# Patient Record
Sex: MALE | Race: WHITE | NOT HISPANIC OR LATINO
[De-identification: names, ages, dates, MRNs, and addresses within clinical notes are randomized per-mention and may not be internally consistent; named-entity substitution may affect disease eponyms.]

---

## 2017-03-02 ENCOUNTER — TRANSCRIPTION ENCOUNTER (OUTPATIENT)
Age: 22
End: 2017-03-02

## 2017-06-26 ENCOUNTER — TRANSCRIPTION ENCOUNTER (OUTPATIENT)
Age: 22
End: 2017-06-26

## 2019-04-05 ENCOUNTER — EMERGENCY (EMERGENCY)
Facility: HOSPITAL | Age: 24
LOS: 1 days | Discharge: HOME | End: 2019-04-05
Attending: EMERGENCY MEDICINE
Payer: COMMERCIAL

## 2019-04-05 VITALS
RESPIRATION RATE: 20 BRPM | HEART RATE: 78 BPM | HEIGHT: 74 IN | TEMPERATURE: 99 F | OXYGEN SATURATION: 97 % | WEIGHT: 274.92 LBS | DIASTOLIC BLOOD PRESSURE: 88 MMHG | SYSTOLIC BLOOD PRESSURE: 151 MMHG

## 2019-04-05 PROCEDURE — 99283 EMERGENCY DEPT VISIT LOW MDM: CPT | Mod: 25

## 2019-04-05 PROCEDURE — 99283 EMERGENCY DEPT VISIT LOW MDM: CPT

## 2019-04-05 PROCEDURE — 73030 X-RAY EXAM OF SHOULDER: CPT | Mod: 26,LT

## 2019-04-05 PROCEDURE — 73030 X-RAY EXAM OF SHOULDER: CPT

## 2019-04-05 PROCEDURE — 99053 MED SERV 10PM-8AM 24 HR FAC: CPT

## 2019-04-05 RX ORDER — IBUPROFEN 200 MG
600 TABLET ORAL ONCE
Refills: 0 | Status: COMPLETED | OUTPATIENT
Start: 2019-04-05 | End: 2019-04-05

## 2019-04-05 RX ADMIN — Medication 600 MILLIGRAM(S): at 06:17

## 2019-04-05 NOTE — ED PROVIDER NOTE - PHYSICAL EXAMINATION
Gen: NAD, AOx3  Head: NCAT  HEENT: PERRL, oral mucosa moist, normal conjunctiva  Lung: CTAB, no respiratory distress  CV: rrr, no murmurs, Normal perfusion  Abd: soft, NTND, no CVA tenderness  MSK: No edema, soft compartments, shoulders are grossly symmetric, L shoulder posteriorly tender without bruising/swelling, all other extremity joints nontender with full ROM; clavicles nontender; snuffbox nontender bilaterally; chest wall nontender; pelvis stable nontender; entire spine without midline tenderness and with full ROM, no stepoffs or masses   Neuro: No focal neurologic deficits, motor and sensation intact/symmetric in all extremities  Skin: No rash   Psych: normal affect

## 2019-04-05 NOTE — ED PROVIDER NOTE - NSFOLLOWUPINSTRUCTIONS_ED_ALL_ED_FT
1. Return to ED for worsening, progressive or any other concerning symptoms such as trouble breathing, severe pain, trouble walking, inability to eat/drink due to vomiting, new weakness on one side of the body, or confusion  2. Follow up with your primary care doctor in 2-3 days, you may also call the internal medicine clinic at [phone] for appointment or dial [phone] for help finding a provider   3. Please take motrin 600 mg every 6 hours and tylenol 1000 mg every 6 hours as needed for pain control. Apply ice packs to areas of muscle soreness for 15-20 minutes at a time, 2-3 times per day.  You will likely have worsened muscle soreness on day 2 or 3 after the accident.   4. Please call one of the orthopedists on the attached sheet for a follow up appointment in the next week      Shoulder Range of Motion Exercises  ImageShoulder range of motion (ROM) exercises are designed to keep the shoulder moving freely. They are often recommended for people who have shoulder pain.    Phase 1 exercises  When you are able, do this exercise 5–6 days per week, or as told by your health care provider. Work toward doing 2 sets of 10 swings.    Pendulum Exercise     How To Do This Exercise Lying Down     Lie face-down on a bed with your abdomen close to the side of the bed.    Let your arm hang over the side of the bed.    Relax your shoulder, arm, and hand.    Slowly and gently swing your arm forward and back. Do not use your neck muscles to swing your arm. They should be relaxed. If you are struggling to swing your arm, have someone gently swing it for you. When you do this exercise for the first time, swing your arm at a 15 degree angle for 15 seconds, or swing your arm 10 times. As pain lessens over time, increase the angle of the swing to 30–45 degrees.    Repeat steps 1–4 with the other arm.      How To Do This Exercise While Standing     Stand next to a sturdy chair or table and hold on to it with your hand.    Bend forward at the waist.    Bend your knees slightly.    Relax your other arm and let it hang limp.    Relax the shoulder blade of the arm that is hanging and let it drop.    While keeping your shoulder relaxed, use body motion to swing your arm in small circles. The first time you do this exercise, swing your arm for about 30 seconds or 10 times. When you do it next time, swing your arm for a little longer.    Stand up tall and relax.    Repeat steps 1–7, this time changing the direction of the circles.      Repeat steps 1–8 with the other arm.      Phase 2 exercises  Do these exercises 3–4 times per day on 5–6 days per week or as told by your health care provider. Work toward holding the stretch for 20 seconds.    Stretching Exercise 1     Lift your arm straight out in front of you.    Bend your arm 90 degrees at the elbow (right angle) so your forearm goes across your body and looks like the letter "L."    Use your other arm to gently pull the elbow forward and across your body.    Repeat steps 1–3 with the other arm.    Stretching Exercise 2     You will need a towel or rope for this exercise.    Bend one arm behind your back with the palm facing outward.    Hold a towel with your other hand.    Reach the arm that holds the towel above your head, and bend that arm at the elbow. Your wrist should be behind your neck.    Use your free hand to grab the free end of the towel.    With the higher hand, gently pull the towel up behind you.    With the lower hand, pull the towel down behind you.    Repeat steps 1–6 with the other arm.      Phase 3 exercises  Do each of these exercises at four different times of day (sessions) every day or as told by your health care provider. To begin with, repeat each exercise 5 times (repetitions). Work toward doing 3 sets of 12 repetitions or as told by your health care provider.    Strengthening Exercise 1     You will need a light weight for this activity. As you grow stronger, you may use a heavier weight.    Standing with a weight in your hand, lift your arm straight out to the side until it is at the same height as your shoulder.    Bend your arm at 90 degrees so that your fingers are pointing to the ceiling.    Slowly raise your hand until your arm is straight up in the air.    Repeat steps 1–3 with the other arm.      Strengthening Exercise 2     You will need a light weight for this activity. As you grow stronger, you may use a heavier weight.    Standing with a weight in your hand, gradually move your straight arm in an arc, starting at your side, then out in front of you, then straight up over your head.    Gradually move your other arm in an arc, starting at your side, then out in front of you, then straight up over your head.    Repeat steps 1–2 with the other arm.      Strengthening Exercise 3     You will need an elastic band for this activity. As you grow stronger, gradually increase the size of the bands or increase the number of bands that you use at one time.    While standing, hold an elastic band in one hand and raise that arm up in the air.    With your other hand, pull down the band until that hand is by your side.    Repeat steps 1–2 with the other arm.      This information is not intended to replace advice given to you by your health care provider. Make sure you discuss any questions you have with your health care provider.

## 2019-04-05 NOTE — ED PROVIDER NOTE - ATTENDING CONTRIBUTION TO CARE
LUE: +TTP anterior shoulder, no deformity, unable to externally rotate or abduct arm past 15 degrees actively, radial pulse +2, sensation intact distally.    XR shoulder r/o fx/dislocation  f/u orthopedics for shoulder strain versus rotator cuff tear.
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
## 2019-04-05 NOTE — ED PROVIDER NOTE - NSFOLLOWUPCLINICS_GEN_ALL_ED_FT
An Orthopedic Surgeon  Orthopedic Surgery  .  [place]   Phone:   Fax:   Follow Up Time:     [facility] Orth[facility]  Orthopedic Surgery  [address] [facility], 3rd & 4th [place] [address]  Phone: [phone]  Fax:   Follow Up Time:
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
## 2020-04-08 ENCOUNTER — TRANSCRIPTION ENCOUNTER (OUTPATIENT)
Age: 25
End: 2020-04-08

## 2024-05-30 ENCOUNTER — EMERGENCY (EMERGENCY)
Facility: HOSPITAL | Age: 29
LOS: 1 days | Discharge: HOME | End: 2024-05-30
Attending: EMERGENCY MEDICINE
Payer: COMMERCIAL

## 2024-05-30 VITALS
OXYGEN SATURATION: 98 % | HEART RATE: 75 BPM | RESPIRATION RATE: 18 BRPM | WEIGHT: 225.09 LBS | DIASTOLIC BLOOD PRESSURE: 90 MMHG | HEIGHT: 74 IN | TEMPERATURE: 98 F | SYSTOLIC BLOOD PRESSURE: 158 MMHG

## 2024-05-30 PROCEDURE — 99283 EMERGENCY DEPT VISIT LOW MDM: CPT

## 2024-05-30 PROCEDURE — 99284 EMERGENCY DEPT VISIT MOD MDM: CPT

## 2024-05-30 RX ORDER — LIDOCAINE HCL 20 MG/ML
10 VIAL (ML) INJECTION ONCE
Refills: 0 | Status: COMPLETED | OUTPATIENT
Start: 2024-05-30 | End: 2024-05-30

## 2024-05-30 RX ADMIN — Medication 10 MILLILITER(S): at 13:20

## 2024-05-30 RX ADMIN — Medication 1 TABLET(S): at 14:09

## 2024-05-30 NOTE — ED PROVIDER NOTE - ATTENDING APP SHARED VISIT CONTRIBUTION OF CARE
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

## 2024-05-30 NOTE — ED PROVIDER NOTE - NSFOLLOWUPINSTRUCTIONS_ED_ALL_ED_FT
Keep dressings clean and dry for 24 hours.  After 24 hours you could wash area with soap and water.  Dry well and apply bacitracin 2 times per day.  Take Augmentin 1 tab every 12 hours x 7 days.  Take Tylenol 1000mgs every 6 hrs and/or Ibuprofen 600mgs every 6 hrs as needed for pain   Follow-up with your primary care provider in 2 to 3 days.  Bring copy of your results with you to your appointment.  Follow-up with hand surgery within 1 week.  Someone will call you in the next few days to help you schedule an appointment  Return to the ER in 10 days for suture removal.  Return to the ER sooner for worsening pain, spreading erythema, pus drainage, fevers or any other concerning symptoms.

## 2024-05-30 NOTE — ED ADULT NURSE NOTE - NSFALLUNIVINTERV_ED_ALL_ED
Bed/Stretcher in lowest position, wheels locked, appropriate side rails in place/Call bell, personal items and telephone in reach/Instruct patient to call for assistance before getting out of bed/chair/stretcher/Non-slip footwear applied when patient is off stretcher/[place] to call system/Physically safe environment - no spills, clutter or unnecessary equipment/Purposeful proactive rounding/Room/bathroom lighting operational, light cord in reach

## 2024-05-30 NOTE — ED ADULT NURSE NOTE - OBJECTIVE STATEMENT
29 year old male, no PMH, presenting to ED c/p dog bite. 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 Yesterday at 9pm was bit by work dog in b/l UE. Patient has puncture wounds on left and right forearm and large laceration to palmar aspect of right hand. Patient seen at different hospital yesterday with negative xrays. Was given dose of Augmentin and tetanus updated. No sutures in place on arrival. patient returning today d/t pain and bleeding around lacerations. ROM and sensation intact. Dog up to date on all vaccines.

## 2024-05-30 NOTE — ED PROVIDER NOTE - PHYSICAL EXAMINATION
Skin: 6cm linear laceration to orozco aspect of right hand.  No foreign body, no exposed bone/tendon, FROM of all digits.  Sensation intact.  no surrounding erythema or discharge.  Small puncture wounds to left forearm and right wrist with mild tenderness.

## 2024-05-30 NOTE — ED PROVIDER NOTE - OBJECTIVE STATEMENT
29-year-old male with no significant past medical history presenting with dog bite.  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  Yesterday around 9 PM he was bit by his work dog and both upper extremities.  Patient with puncture wounds to the left forearm and right forearm and a large laceration to the palmar aspect of the right hand.  Patient was seen at a outside hospital last night and had x-rays that were negative (results at bedside), his tetanus was updated and was given a dose of Augmentin.  None of the lacerations were sutured.  Patient presenting today with worsening pain and persistent bleeding to laceration of the right hand.  Patient has full range of motion and sensation is intact.  Mild tingling around laceration site.  No additional injuries.  Patient has no other complaints.  Dog is up-to-date on all vaccinations.

## 2024-05-30 NOTE — ED PROVIDER NOTE - CARE PROVIDER_API CALL
Kenzie Ortega  Surgery of the Hand  [address], [address]  [place] [address]  Phone: [phone]  Fax: [phone]  Follow Up Time:

## 2024-05-30 NOTE — ED PROVIDER NOTE - PATIENT PORTAL LINK FT
You can access the FollowMyHealth Patient Portal offered by [facility] by registering at the following website: [email or web address]. By joining [facility]’s FollowMyHealth portal, you will also be able to view your health information using other applications (apps) compatible with our system.